# Patient Record
Sex: MALE | Race: OTHER
[De-identification: names, ages, dates, MRNs, and addresses within clinical notes are randomized per-mention and may not be internally consistent; named-entity substitution may affect disease eponyms.]

---

## 2019-11-18 PROBLEM — Z00.00 ENCOUNTER FOR PREVENTIVE HEALTH EXAMINATION: Status: ACTIVE | Noted: 2019-11-18

## 2019-11-20 ENCOUNTER — APPOINTMENT (OUTPATIENT)
Dept: BARIATRICS | Facility: CLINIC | Age: 39
End: 2019-11-20
Payer: MEDICAID

## 2019-11-20 VITALS
OXYGEN SATURATION: 99 % | DIASTOLIC BLOOD PRESSURE: 86 MMHG | HEART RATE: 59 BPM | BODY MASS INDEX: 21.57 KG/M2 | SYSTOLIC BLOOD PRESSURE: 124 MMHG | WEIGHT: 159.25 LBS | TEMPERATURE: 97.8 F | HEIGHT: 72 IN

## 2019-11-20 DIAGNOSIS — K40.90 UNILATERAL INGUINAL HERNIA, W/OUT OBSTRUCTION OR GANGRENE, NOT SPECIFIED AS RECURRENT: ICD-10-CM

## 2019-11-20 PROCEDURE — 99203 OFFICE O/P NEW LOW 30 MIN: CPT

## 2020-01-19 PROBLEM — K40.90 INGUINAL HERNIA, RIGHT: Status: ACTIVE | Noted: 2020-01-19

## 2020-01-19 NOTE — HISTORY OF PRESENT ILLNESS
[de-identified] : Patient is a 39 year old male with no past medical history who complains of pain and deformity in his right groin for several months. he states that he has noticed a bulge in his right groin which is worsened with standing but disappears when he is laying down. He denies any GI obstructive symptoms.

## 2020-01-19 NOTE — ASSESSMENT
[FreeTextEntry1] : The plan is to do a laparoscopic robotic-assisted right inguinal hernia repair with mesh. I have reviewed the operation as well as the risks and benefits of the procedure with the patient, and he understands this information fully.